# Patient Record
Sex: FEMALE | Race: WHITE
[De-identification: names, ages, dates, MRNs, and addresses within clinical notes are randomized per-mention and may not be internally consistent; named-entity substitution may affect disease eponyms.]

---

## 2021-05-30 ENCOUNTER — HOSPITAL ENCOUNTER (EMERGENCY)
Dept: HOSPITAL 8 - ED | Age: 41
Discharge: HOME | End: 2021-05-30
Payer: MEDICAID

## 2021-05-30 VITALS — BODY MASS INDEX: 29.35 KG/M2 | WEIGHT: 149.47 LBS | HEIGHT: 60 IN

## 2021-05-30 VITALS — SYSTOLIC BLOOD PRESSURE: 111 MMHG | DIASTOLIC BLOOD PRESSURE: 79 MMHG

## 2021-05-30 DIAGNOSIS — J45.901: Primary | ICD-10-CM

## 2021-05-30 DIAGNOSIS — J02.9: ICD-10-CM

## 2021-05-30 PROCEDURE — 94640 AIRWAY INHALATION TREATMENT: CPT

## 2021-05-30 PROCEDURE — 99283 EMERGENCY DEPT VISIT LOW MDM: CPT

## 2021-05-30 NOTE — NUR
PT AMBULATED TO ROOM WITHOUT DIFFICULTY, PT HAS FRIEND BEDSIDE. PT HAS C/O 
COUGH AND "ASTHMA SYMTOMS" PT ON MONITOR WITH PT VSS. PT HAS COUGH. PT A/O X4